# Patient Record
Sex: MALE | Race: WHITE | NOT HISPANIC OR LATINO | Employment: UNEMPLOYED | URBAN - METROPOLITAN AREA
[De-identification: names, ages, dates, MRNs, and addresses within clinical notes are randomized per-mention and may not be internally consistent; named-entity substitution may affect disease eponyms.]

---

## 2024-03-31 ENCOUNTER — APPOINTMENT (OUTPATIENT)
Dept: GENERAL RADIOLOGY | Facility: HOSPITAL | Age: 49
End: 2024-03-31
Payer: COMMERCIAL

## 2024-03-31 ENCOUNTER — HOSPITAL ENCOUNTER (EMERGENCY)
Facility: HOSPITAL | Age: 49
Discharge: HOME | End: 2024-03-31
Attending: EMERGENCY MEDICINE | Admitting: EMERGENCY MEDICINE
Payer: COMMERCIAL

## 2024-03-31 VITALS
DIASTOLIC BLOOD PRESSURE: 92 MMHG | BODY MASS INDEX: 18.32 KG/M2 | RESPIRATION RATE: 18 BRPM | HEART RATE: 100 BPM | HEIGHT: 69 IN | WEIGHT: 123.68 LBS | SYSTOLIC BLOOD PRESSURE: 120 MMHG | TEMPERATURE: 98.3 F | OXYGEN SATURATION: 94 %

## 2024-03-31 DIAGNOSIS — S20.211A CONTUSION OF RIGHT CHEST WALL, INITIAL ENCOUNTER: ICD-10-CM

## 2024-03-31 DIAGNOSIS — F10.920 ACUTE ALCOHOLIC INTOXICATION WITHOUT COMPLICATION: Primary | ICD-10-CM

## 2024-03-31 DIAGNOSIS — Z13.9 ENCOUNTER FOR MEDICAL SCREENING EXAMINATION: ICD-10-CM

## 2024-03-31 DIAGNOSIS — R68.84 JAW PAIN: ICD-10-CM

## 2024-03-31 PROCEDURE — 99283 EMERGENCY DEPT VISIT LOW MDM: CPT
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
## 2024-03-31 NOTE — SIGNIFICANT NOTE
03/31/24 1144   Plan   Final Note SW met with pt at bedside this date to discuss possible ride home. Pt reports that he does not have anyone that can come and pick him up. Pt is AOx3. Pt discussed sexual orientation this date with SW and was tearful in discussing prior attempts to get sober. SW discussed inpatient options this date and pt declined at this time. Pt then began requesting staff phone numbers and making sexualized comments. SW left room this date. MO provided pt with cab voucher and waited with pt until cab arrived. Pt reported to SW that he has family issues at home and there are several people residing in the residence. Pt reports that he was employed at [employer] and has had multiple jobs. Pt reports that he does want to try inpatient again when he is ready with transition to sober living. MO discussed those options with pt and assisted pt in getting in cab this date to return home. MO updated provider and nursing staff.